# Patient Record
Sex: MALE | Race: WHITE | URBAN - METROPOLITAN AREA
[De-identification: names, ages, dates, MRNs, and addresses within clinical notes are randomized per-mention and may not be internally consistent; named-entity substitution may affect disease eponyms.]

---

## 2022-04-25 ENCOUNTER — OFFICE VISIT (OUTPATIENT)
Dept: URBAN - METROPOLITAN AREA CLINIC 16 | Facility: CLINIC | Age: 77
End: 2022-04-25
Payer: MEDICARE

## 2022-04-25 DIAGNOSIS — H16.223 KERATOCONJUNCTIVITIS SICCA, BILATERAL: Primary | ICD-10-CM

## 2022-04-25 DIAGNOSIS — H52.13 MYOPIA, BILATERAL: ICD-10-CM

## 2022-04-25 DIAGNOSIS — H25.13 AGE-RELATED NUCLEAR CATARACT, BILATERAL: ICD-10-CM

## 2022-04-25 DIAGNOSIS — H43.393 OTHER VITREOUS OPACITIES, BILATERAL: ICD-10-CM

## 2022-04-25 PROCEDURE — 92004 COMPRE OPH EXAM NEW PT 1/>: CPT | Performed by: OPTOMETRIST

## 2022-04-25 ASSESSMENT — KERATOMETRY
OS: 43.50
OD: 42.75

## 2022-04-25 ASSESSMENT — INTRAOCULAR PRESSURE
OS: 16
OD: 16

## 2022-04-25 ASSESSMENT — VISUAL ACUITY
OS: 20/20
OD: 20/20

## 2022-04-25 NOTE — IMPRESSION/PLAN
Impression: Keratoconjunctivitis sicca, bilateral: Z23.067. Plan: Pt ed re: condition. Use AT's BID-QID OU, Omega-3 fatty acids, humidifier. Consider Restasis, Alrex if condition not improved at next exam.  RTC 3-4 weeks if conditions persist; otherwise, RTC 1 year x CEE.

## 2022-04-25 NOTE — IMPRESSION/PLAN
Impression: Myopia, bilateral: H52.13. Plan: Refractive error accounts for symptoms. Release SRX. RTC 1 year x CEE.